# Patient Record
Sex: MALE | Race: WHITE | NOT HISPANIC OR LATINO
[De-identification: names, ages, dates, MRNs, and addresses within clinical notes are randomized per-mention and may not be internally consistent; named-entity substitution may affect disease eponyms.]

---

## 2020-05-16 ENCOUNTER — TRANSCRIPTION ENCOUNTER (OUTPATIENT)
Age: 65
End: 2020-05-16

## 2022-01-25 ENCOUNTER — NON-APPOINTMENT (OUTPATIENT)
Age: 67
End: 2022-01-25

## 2022-03-07 PROBLEM — Z00.00 ENCOUNTER FOR PREVENTIVE HEALTH EXAMINATION: Status: ACTIVE | Noted: 2022-03-07

## 2022-03-10 ENCOUNTER — APPOINTMENT (OUTPATIENT)
Dept: GASTROENTEROLOGY | Facility: CLINIC | Age: 67
End: 2022-03-10
Payer: COMMERCIAL

## 2022-03-10 ENCOUNTER — NON-APPOINTMENT (OUTPATIENT)
Age: 67
End: 2022-03-10

## 2022-03-10 VITALS
TEMPERATURE: 97.5 F | BODY MASS INDEX: 32.93 KG/M2 | HEIGHT: 70 IN | WEIGHT: 230 LBS | HEART RATE: 65 BPM | DIASTOLIC BLOOD PRESSURE: 84 MMHG | SYSTOLIC BLOOD PRESSURE: 134 MMHG

## 2022-03-10 DIAGNOSIS — K21.9 GASTRO-ESOPHAGEAL REFLUX DISEASE W/OUT ESOPHAGITIS: ICD-10-CM

## 2022-03-10 DIAGNOSIS — Z80.0 FAMILY HISTORY OF MALIGNANT NEOPLASM OF DIGESTIVE ORGANS: ICD-10-CM

## 2022-03-10 PROCEDURE — 99214 OFFICE O/P EST MOD 30 MIN: CPT

## 2022-03-10 RX ORDER — ATORVASTATIN CALCIUM 10 MG/1
10 TABLET, FILM COATED ORAL
Refills: 0 | Status: ACTIVE | COMMUNITY

## 2022-03-10 RX ORDER — PANTOPRAZOLE 40 MG/1
40 TABLET, DELAYED RELEASE ORAL
Refills: 0 | Status: ACTIVE | COMMUNITY

## 2022-03-10 RX ORDER — ASPIRIN ENTERIC COATED TABLETS 81 MG 81 MG/1
81 TABLET, DELAYED RELEASE ORAL
Refills: 0 | Status: ACTIVE | COMMUNITY

## 2022-03-10 NOTE — ASSESSMENT
[FreeTextEntry1] : 1.  patient with personal hx of a large polyp, at an early age...should have his [age] year fu colonoscopy, especially with the above family history\par 2.  patient when he had his first egd..had a 'tongue' of gastric mucosa, and the biopsies demonstrated Barretts\par 3.  patient then needs egd and colon together\par 4.  excellent overall medical health, never smoked, no cardiovascular disease\par 5.  patient then is going to set up these combined procedures\par 6.  i will check on previous genetic testing\par 7.  i have discussed with Tyson having bone density testing\par 8.  perhaps in future, we might try to taper down the dose of pantoprazole\par \par AS WE OBTAIN INFORMED CONSENT FOR COLONOSCOPY, UPPER ENDOSCOPY [EGD], OR BOTH PROCEDURES TOGETHER;\par \par As with all procedures, there are risks of which the patient should be aware\par \par 1.  Anesthesia; deep sedation with Propofol;  there is a small risk of aspiration and pulmonary infection.  The Anesthesiologist meets with the patient the morning of the procedure to discuss in more detail\par \par 2.  risk of bleeding; from removal of a polyp, or rarely, from biopsies, 1 % or less\par \par 3.  risk of injury or perforation of the colon or upper GI tract; one in a thousand or less,  from removing a polyp or from advancing the instrument\par \par \par

## 2022-03-10 NOTE — HISTORY OF PRESENT ILLNESS
[FreeTextEntry1] : this is a [age] year old gentleman, in good health, with the following gi history\par \par a.  he had early colonoscopy because his Dad had ca of colon, in mid forties.\par therefore, we did Moy first colonoscopy when he was [age], and he had a large colon polyp which was removed.. this was twenty years ago...his last few colonoscopies have been negative\par b.  there is the following family history of colon cancer and polyps\par 1,  his father as above, had a hx of colon cancer, and has been a survivor\par 2.  patient s brother had colon polyps , at early age\par 3.  younger sister,  has anti cardiolipin ab, on blood thinners\par 4.  other sister no polyps or cancer\par 5.  patients paternal aunt; colon cancer as well\par \par no fh of other gi cancer, or gyn cancer, or breast ca, or pancreatic or renal canal\par \par patient has nl bowel movements\par his last colonoscopy, negative, as were the last few, in sept 2018, so we suggested a fu in three years\par \par 2.  history of classic esophageal reflux, with typical reflux, and perhapshe has been on ppi for the last three years since sept 2018..\par the patients heartburn is generally triggered by the usual esophageal triggers\par with pantoprazole every day, he can eat just about anything\par has not had a bone density test..something to consider in the future as ppi meds but given longer can cause osteoporosis or brittle bones, and bony fractures.

## 2022-04-10 ENCOUNTER — RESULT REVIEW (OUTPATIENT)
Age: 67
End: 2022-04-10

## 2022-04-12 ENCOUNTER — RESULT REVIEW (OUTPATIENT)
Age: 67
End: 2022-04-12

## 2022-04-13 ENCOUNTER — APPOINTMENT (OUTPATIENT)
Dept: GASTROENTEROLOGY | Facility: HOSPITAL | Age: 67
End: 2022-04-13

## 2022-04-13 ENCOUNTER — TRANSCRIPTION ENCOUNTER (OUTPATIENT)
Age: 67
End: 2022-04-13

## 2022-04-16 ENCOUNTER — NON-APPOINTMENT (OUTPATIENT)
Age: 67
End: 2022-04-16

## 2022-09-13 ENCOUNTER — APPOINTMENT (OUTPATIENT)
Dept: GASTROENTEROLOGY | Facility: CLINIC | Age: 67
End: 2022-09-13

## 2022-09-13 DIAGNOSIS — K22.70 BARRETT'S ESOPHAGUS W/OUT DYSPLASIA: ICD-10-CM

## 2022-09-13 DIAGNOSIS — K21.9 GASTRO-ESOPHAGEAL REFLUX DISEASE W/OUT ESOPHAGITIS: ICD-10-CM

## 2022-09-13 DIAGNOSIS — Z86.010 PERSONAL HISTORY OF COLONIC POLYPS: ICD-10-CM

## 2022-09-13 PROCEDURE — 99443: CPT | Mod: 95

## 2022-09-13 NOTE — HISTORY OF PRESENT ILLNESS
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  No suspicious abnormalities.  Biopsies taken which were confirmatory but no dysplasia.\par \par The issue has been what to do regarding PPI medication since the patient has been feeling virtually asymptomatic and has not been taking any PPI medication for some months now as for colonoscopy, on April 13 there were no colonic polyps\par \par He does have a strong history as he had a large adenomatous polyp when he was 46, several polyps since, his father had colon cancer at the age of 40, and his fatheraunt had hadcol:on cancer.\par \par I had sent Tyson for genetic testing, it was reported to be negative but I do not have the report right now.\par \par Tyson will see if he can get a copy as it was done in the [facility] office.  Perhaps his Suzette Boxer.\par \par Again the last colonoscopy was negative on April 13

## 2022-09-13 NOTE — ASSESSMENT
[FreeTextEntry1] : 1.  We discussed alginate therapy which I feel will be very useful for the patient we will try reflux Gourmet on an intermittent basis\par 2.  Since he is not having any symptoms of reflux of significance, I am not going to make a strong suggestion in favor of chronic PPI therapy.  I did go over the issue which has been raised, as some people feel that patient with Burkett's should always be on PPI therapy, and some people are more apt to base it or the need for on the presence or absence of symptoms\par 3.  We will try to get the official copy of the genetics report and I also thought that Tyson should ask Ms. Boxer if there is any update on the genetic testing could be useful\par \par We will have a follow-up visit in 1 year